# Patient Record
Sex: MALE | Race: BLACK OR AFRICAN AMERICAN | NOT HISPANIC OR LATINO | Employment: FULL TIME | URBAN - METROPOLITAN AREA
[De-identification: names, ages, dates, MRNs, and addresses within clinical notes are randomized per-mention and may not be internally consistent; named-entity substitution may affect disease eponyms.]

---

## 2022-11-06 ENCOUNTER — HOSPITAL ENCOUNTER (EMERGENCY)
Facility: HOSPITAL | Age: 52
Discharge: HOME | End: 2022-11-06
Attending: EMERGENCY MEDICINE
Payer: OTHER MISCELLANEOUS

## 2022-11-06 VITALS
DIASTOLIC BLOOD PRESSURE: 84 MMHG | HEIGHT: 67 IN | WEIGHT: 170 LBS | RESPIRATION RATE: 18 BRPM | BODY MASS INDEX: 26.68 KG/M2 | SYSTOLIC BLOOD PRESSURE: 129 MMHG | HEART RATE: 65 BPM | OXYGEN SATURATION: 98 % | TEMPERATURE: 98 F

## 2022-11-06 DIAGNOSIS — S90.31XA CONTUSION OF RIGHT FOOT, INITIAL ENCOUNTER: Primary | ICD-10-CM

## 2022-11-06 DIAGNOSIS — S99.921A RIGHT FOOT INJURY: ICD-10-CM

## 2022-11-06 PROCEDURE — 73630 X-RAY EXAM OF FOOT: CPT | Mod: TC,RT

## 2022-11-06 PROCEDURE — 73630 X-RAY EXAM OF FOOT: CPT | Mod: 26,RT,, | Performed by: RADIOLOGY

## 2022-11-06 PROCEDURE — 73630 XR FOOT COMPLETE 3 VIEW RIGHT: ICD-10-PCS | Mod: 26,RT,, | Performed by: RADIOLOGY

## 2022-11-06 PROCEDURE — 99283 EMERGENCY DEPT VISIT LOW MDM: CPT | Mod: 25

## 2022-11-06 NOTE — ED PROVIDER NOTES
Encounter Date: 11/6/2022       History     Chief Complaint   Patient presents with    Foot Injury     The history is provided by the patient.   Foot Injury   The incident occurred at work. The injury mechanism was a direct blow (a heavy wheeled grill dropped on the right foot). The incident occurred just prior to arrival. The quality of the pain is described as aching. The pain is at a severity of 5/10. He reports no foreign bodies present. The symptoms are aggravated by bearing weight. He has tried NSAIDs for the symptoms. The treatment provided moderate relief.   Review of patient's allergies indicates:  No Known Allergies  History reviewed. No pertinent past medical history.  Past Surgical History:   Procedure Laterality Date    LAPAROSCOPIC REPAIR OF INGUINAL HERNIA       History reviewed. No pertinent family history.  Social History     Tobacco Use    Smoking status: Every Day     Types: Cigarettes, Cigars    Smokeless tobacco: Never   Substance Use Topics    Drug use: Never     Review of Systems   Musculoskeletal:  Positive for arthralgias and myalgias.   All other systems reviewed and are negative.    Physical Exam     Initial Vitals [11/06/22 1303]   BP Pulse Resp Temp SpO2   (!) 126/104 65 18 98 °F (36.7 °C) 98 %      MAP       --         Physical Exam    Constitutional: He appears well-developed and well-nourished.   HENT:   Head: Normocephalic.   Eyes: Pupils are equal, round, and reactive to light.   Neck:   Normal range of motion.  Cardiovascular:  Normal rate.           Pulmonary/Chest: Breath sounds normal.   Musculoskeletal:         General: Normal range of motion.      Cervical back: Normal range of motion.      Right foot: Normal range of motion and normal capillary refill. Tenderness (dorsal midfoot) and bony tenderness present. No swelling, deformity, bunion, Charcot foot, foot drop, prominent metatarsal heads, laceration or crepitus. Normal pulse.      Left foot: Normal.         Legs:      Neurological: He is alert. GCS score is 15. GCS eye subscore is 4. GCS verbal subscore is 5. GCS motor subscore is 6.   Skin: Skin is warm. Capillary refill takes less than 2 seconds.   Psychiatric: He has a normal mood and affect.       ED Course   Procedures  Labs Reviewed - No data to display       Imaging Results              X-Ray Foot Complete Right (Final result)  Result time 11/06/22 14:05:27      Final result by Jocelyn Celis MD (11/06/22 14:05:27)                   Impression:      There are degenerative changes of the right foot.      Electronically signed by: Jocelyn Celis MD  Date:    11/06/2022  Time:    14:05               Narrative:    EXAMINATION:  XR FOOT COMPLETE 3 VIEW RIGHT    CLINICAL HISTORY:  . Unspecified injury of right foot, initial encounter    TECHNIQUE:  AP, lateral, and oblique views of the right foot were performed.    COMPARISON:  None    FINDINGS:  There are degenerative changes of the right foot without evidence fracture or malalignment.  The adjacent soft tissues are unremarkable.                                       Medications - No data to display                           Clinical Impression:   Final diagnoses:  [S99.921A] Right foot injury  [S90.31XA] Contusion of right foot, initial encounter (Primary)        ED Disposition Condition    Discharge Stable          ED Prescriptions    None       Follow-up Information       Follow up With Specialties Details Why Contact Info    PCP  In 2 days      [facility] Emergency Medicine  If symptoms worsen [address] [facility] [id number] [phone]             Ish Castle NP  11/06/22 [id number]

## 2022-11-06 NOTE — DISCHARGE INSTRUCTIONS
Apply ice, take OTC ibuprofen/acetaminophen as needed. Return for any worsening or new symptoms. Follow up with Primary Care Provider in the next 2-3 days.

## 2022-11-06 NOTE — ED TRIAGE NOTES
"Pt states "the grill fell on my foot while at work this morning." Small skin tear noted to top of right foot, no active bleeding. Tender to touch. Cap refil < 3 seconds. Right pedal pulse present.   "

## 2022-11-06 NOTE — Clinical Note
"Jared Arguello" Idalia was seen and treated in our emergency department on 11/6/2022.  He may return to work on 11/09/2022.       If you have any questions or concerns, please don't hesitate to call.      Ish Castle NP"

## 2022-11-21 ENCOUNTER — TELEPHONE (OUTPATIENT)
Dept: URGENT CARE | Facility: CLINIC | Age: 52
End: 2022-11-21
Payer: COMMERCIAL

## 2023-11-17 ENCOUNTER — TELEPHONE (OUTPATIENT)
Dept: FAMILY MEDICINE | Facility: CLINIC | Age: 53
End: 2023-11-17
Payer: COMMERCIAL

## 2023-11-17 NOTE — TELEPHONE ENCOUNTER
----- Message from Shazia Kong sent at 11/17/2023  8:22 AM CST -----  Type:  Needs Medical Advice    Who Called: pt   Best Call Back Number: [phone] (home)     Additional Information:  Requesting call back pt has some questions about hernia sx. Pt has been in pain . In groin area on the leg     please advise thank you